# Patient Record
Sex: MALE | Race: WHITE | NOT HISPANIC OR LATINO | Employment: UNEMPLOYED | URBAN - NONMETROPOLITAN AREA
[De-identification: names, ages, dates, MRNs, and addresses within clinical notes are randomized per-mention and may not be internally consistent; named-entity substitution may affect disease eponyms.]

---

## 2018-07-05 NOTE — PROGRESS NOTES
"  Subjective     PROBLEM LIST:  1. Leukocytosis  2. Depression/anxiety  3. Thyroid disease  4. Hypertension  5. Ankylosing spondylitis  6. Alopecia     CHIEF COMPLAINT: leukocytosis      HISTORY OF PRESENT ILLNESS:  The patient is a 47 y.o. year old male, referred for evaluation of leukocytosis.  He reports that his white count was elevated since January that has been gradually improving.  When he had his last blood work done in April he was recovering from an episode of bronchitis.  He says he has had some shoulder pain and general muscle stiffness.  He has a history of ankylosing spondylitis but is not currently on any treatment for that.    He smokes 1.5 ppd x 27 years.    REVIEW OF SYSTEMS:  A 14 point review of systems was performed and is negative except as noted above.    History reviewed. No pertinent past medical history.      No current outpatient prescriptions on file prior to visit.     No current facility-administered medications on file prior to visit.        No Known Allergies    History reviewed. No pertinent surgical history.    Social History     Social History   • Marital status: Unknown     Social History Main Topics   • Smoking status: Heavy Tobacco Smoker     Packs/day: 1.50   • Alcohol use Yes   • Drug use: Yes     Other Topics Concern   • Not on file       History reviewed. No pertinent family history.    Objective     /95   Pulse 65   Temp 98.2 °F (36.8 °C) (Temporal Artery )   Resp 17   Ht 182.9 cm (72\")   Wt 114 kg (252 lb)   BMI 34.18 kg/m²   Performance Status: 0  General: well appearing male in no acute distress  Neuro: alert and oriented  HEENT: sclera anicteric, oropharynx clear  Lymphatics: no cervical, supraclavicular, or axillary adenopathy  Cardiovascular: regular rate and rhythm, no murmurs  Lungs: clear to auscultation bilaterally  Abdomen: soft, nontender, nondistended.  No palpable organomegaly  Extremeties: no lower extremity edema  Skin: no rashes, lesions, " bruising, or petechiae  Psych: mood and affect appropriate      Labs:  4/12/18 - wbc 11.9, hgb 16.5, plt 217, ANC 7557  2/14/18 - wbc 13.3, hgb 152, plt 178, ANC 9363  1/26/18 - wbc 17.8, hgb 162, plt 210      Assessment/Plan     J Carlos Reece is a 47 y.o. year old male referred for evaluation of leukocytosis.  We discussed the potential causes of an elevated white count including inflammation, infection, or bone marrow disease.  Cigarette use can cause a chronic inflammation and is commonly associated with a chronic elevation of the white count.  Chronic inflammatory diseases such as rheumatoid arthritis or other autoimmune diseases can cause a leukocytosis.  Bone marrow diseases are less common but include myeloproliferative disorders like chronic myelogenous leukemia or polycythemia vera.    In his situation his white count elevation is fairly mild and has actually been decreasing over the past few months.  He does have a history of autoimmune disease, which could be contributing to the white count elevation.  It is also possible that his cigarette use is contributing.  We will recheck his labs today and I will contact him with the results.  If his white count is normal at all think any further workup is needed.  If it remains mildly elevated and I will recommend follow-up in about 3 months.             Inga Gerardo MD    7/6/2018

## 2018-07-06 ENCOUNTER — CONSULT (OUTPATIENT)
Dept: ONCOLOGY | Facility: CLINIC | Age: 48
End: 2018-07-06

## 2018-07-06 ENCOUNTER — APPOINTMENT (OUTPATIENT)
Dept: LAB | Facility: HOSPITAL | Age: 48
End: 2018-07-06

## 2018-07-06 VITALS
RESPIRATION RATE: 17 BRPM | DIASTOLIC BLOOD PRESSURE: 95 MMHG | HEIGHT: 72 IN | HEART RATE: 65 BPM | BODY MASS INDEX: 34.13 KG/M2 | WEIGHT: 252 LBS | TEMPERATURE: 98.2 F | SYSTOLIC BLOOD PRESSURE: 149 MMHG

## 2018-07-06 DIAGNOSIS — D72.829 LEUKOCYTOSIS, UNSPECIFIED TYPE: Primary | ICD-10-CM

## 2018-07-06 LAB
BASOPHILS # BLD AUTO: 0.25 10*3/MM3 (ref 0–0.2)
BASOPHILS NFR BLD AUTO: 1.5 % (ref 0–2.5)
DEPRECATED RDW RBC AUTO: 48.2 FL (ref 37–54)
EOSINOPHIL # BLD AUTO: 0.44 10*3/MM3 (ref 0–0.7)
EOSINOPHIL NFR BLD AUTO: 2.7 % (ref 0–7)
ERYTHROCYTE [DISTWIDTH] IN BLOOD BY AUTOMATED COUNT: 14.2 % (ref 11.5–14.5)
HCT VFR BLD AUTO: 49.1 % (ref 42–52)
HGB BLD-MCNC: 16.7 G/DL (ref 14–18)
IMM GRANULOCYTES # BLD: 0.07 10*3/MM3 (ref 0–0.06)
IMM GRANULOCYTES NFR BLD: 0.4 % (ref 0–0.6)
LYMPHOCYTES # BLD AUTO: 3.18 10*3/MM3 (ref 0.6–3.4)
LYMPHOCYTES NFR BLD AUTO: 19.6 % (ref 10–50)
MCH RBC QN AUTO: 31.5 PG (ref 27–31)
MCHC RBC AUTO-ENTMCNC: 34 G/DL (ref 30–37)
MCV RBC AUTO: 92.6 FL (ref 80–94)
MONOCYTES # BLD AUTO: 1.06 10*3/MM3 (ref 0–0.9)
MONOCYTES NFR BLD AUTO: 6.5 % (ref 0–12)
NEUTROPHILS # BLD AUTO: 11.19 10*3/MM3 (ref 2–6.9)
NEUTROPHILS NFR BLD AUTO: 69.3 % (ref 37–80)
NRBC BLD MANUAL-RTO: 0 /100 WBC (ref 0–0)
PLATELET # BLD AUTO: 211 10*3/MM3 (ref 130–400)
PMV BLD AUTO: 12.2 FL (ref 6–12)
RBC # BLD AUTO: 5.3 10*6/MM3 (ref 4.7–6.1)
WBC NRBC COR # BLD: 16.19 10*3/MM3 (ref 4.8–10.8)

## 2018-07-06 PROCEDURE — 85025 COMPLETE CBC W/AUTO DIFF WBC: CPT | Performed by: INTERNAL MEDICINE

## 2018-07-06 PROCEDURE — 36415 COLL VENOUS BLD VENIPUNCTURE: CPT | Performed by: INTERNAL MEDICINE

## 2018-07-06 PROCEDURE — 99204 OFFICE O/P NEW MOD 45 MIN: CPT | Performed by: INTERNAL MEDICINE

## 2018-07-06 RX ORDER — LEVOTHYROXINE SODIUM 0.07 MG/1
TABLET ORAL
Refills: 0 | COMMUNITY
Start: 2018-05-09

## 2018-07-06 RX ORDER — LISINOPRIL AND HYDROCHLOROTHIAZIDE 12.5; 1 MG/1; MG/1
TABLET ORAL
Refills: 0 | COMMUNITY
Start: 2018-05-24

## 2018-07-06 RX ORDER — LORATADINE 10 MG/1
10 TABLET ORAL DAILY
Refills: 0 | COMMUNITY
Start: 2018-05-17

## 2018-07-06 RX ORDER — FLUTICASONE PROPIONATE 50 MCG
2 SPRAY, SUSPENSION (ML) NASAL DAILY
COMMUNITY

## 2018-07-06 RX ORDER — ATENOLOL 50 MG/1
TABLET ORAL
Refills: 0 | COMMUNITY
Start: 2018-04-26

## 2018-07-06 RX ORDER — LANSOPRAZOLE 30 MG/1
CAPSULE, DELAYED RELEASE ORAL
Refills: 0 | COMMUNITY
Start: 2018-05-09

## 2018-07-10 ENCOUNTER — TELEPHONE (OUTPATIENT)
Dept: ONCOLOGY | Facility: CLINIC | Age: 48
End: 2018-07-10

## 2018-07-10 NOTE — TELEPHONE ENCOUNTER
Called patient to discuss lab results.  WBC is 16K, within the range of what it's been for the past 6 months.  This is most likely related to inflammation or cigarette use.  Recommended following up with rheumatology re: his joint symptoms.  Otherwise I will see him back in 3 months to recheck CBC.

## 2018-10-12 ENCOUNTER — OFFICE VISIT (OUTPATIENT)
Dept: ONCOLOGY | Facility: CLINIC | Age: 48
End: 2018-10-12

## 2018-10-12 ENCOUNTER — APPOINTMENT (OUTPATIENT)
Dept: LAB | Facility: HOSPITAL | Age: 48
End: 2018-10-12

## 2018-10-12 VITALS
SYSTOLIC BLOOD PRESSURE: 131 MMHG | RESPIRATION RATE: 18 BRPM | HEART RATE: 58 BPM | BODY MASS INDEX: 34 KG/M2 | TEMPERATURE: 98 F | WEIGHT: 251 LBS | HEIGHT: 72 IN | DIASTOLIC BLOOD PRESSURE: 81 MMHG

## 2018-10-12 DIAGNOSIS — D72.829 LEUKOCYTOSIS, UNSPECIFIED TYPE: Primary | ICD-10-CM

## 2018-10-12 LAB
BASOPHILS # BLD AUTO: 0.17 10*3/MM3 (ref 0–0.2)
BASOPHILS NFR BLD AUTO: 1.3 % (ref 0–2.5)
DEPRECATED RDW RBC AUTO: 47.8 FL (ref 37–54)
EOSINOPHIL # BLD AUTO: 0.35 10*3/MM3 (ref 0–0.7)
EOSINOPHIL NFR BLD AUTO: 2.7 % (ref 0–7)
ERYTHROCYTE [DISTWIDTH] IN BLOOD BY AUTOMATED COUNT: 13.4 % (ref 11.5–14.5)
HCT VFR BLD AUTO: 45.7 % (ref 42–52)
HGB BLD-MCNC: 15.3 G/DL (ref 14–18)
IMM GRANULOCYTES # BLD: 0.05 10*3/MM3 (ref 0–0.06)
IMM GRANULOCYTES NFR BLD: 0.4 % (ref 0–0.6)
LYMPHOCYTES # BLD AUTO: 2.74 10*3/MM3 (ref 0.6–3.4)
LYMPHOCYTES NFR BLD AUTO: 20.8 % (ref 10–50)
MCH RBC QN AUTO: 31.6 PG (ref 27–31)
MCHC RBC AUTO-ENTMCNC: 33.5 G/DL (ref 30–37)
MCV RBC AUTO: 94.4 FL (ref 80–94)
MONOCYTES # BLD AUTO: 0.9 10*3/MM3 (ref 0–0.9)
MONOCYTES NFR BLD AUTO: 6.8 % (ref 0–12)
NEUTROPHILS # BLD AUTO: 8.97 10*3/MM3 (ref 2–6.9)
NEUTROPHILS NFR BLD AUTO: 68 % (ref 37–80)
NRBC BLD MANUAL-RTO: 0 /100 WBC (ref 0–0)
PLATELET # BLD AUTO: 205 10*3/MM3 (ref 130–400)
PMV BLD AUTO: 11.5 FL (ref 6–12)
RBC # BLD AUTO: 4.84 10*6/MM3 (ref 4.7–6.1)
WBC NRBC COR # BLD: 13.18 10*3/MM3 (ref 4.8–10.8)

## 2018-10-12 PROCEDURE — 85025 COMPLETE CBC W/AUTO DIFF WBC: CPT | Performed by: NURSE PRACTITIONER

## 2018-10-12 PROCEDURE — 36415 COLL VENOUS BLD VENIPUNCTURE: CPT | Performed by: NURSE PRACTITIONER

## 2018-10-12 PROCEDURE — 99213 OFFICE O/P EST LOW 20 MIN: CPT | Performed by: NURSE PRACTITIONER

## 2018-10-12 RX ORDER — BUSPIRONE HYDROCHLORIDE 30 MG/1
30 TABLET ORAL 2 TIMES DAILY
Refills: 0 | COMMUNITY
Start: 2018-10-03

## 2018-10-12 RX ORDER — NAPROXEN 500 MG/1
500 TABLET ORAL 2 TIMES DAILY
Refills: 0 | COMMUNITY
Start: 2018-08-21

## 2018-10-12 RX ORDER — CHLORAL HYDRATE 500 MG
CAPSULE ORAL
COMMUNITY

## 2018-10-12 NOTE — PROGRESS NOTES
"  Subjective     PROBLEM LIST:  1. Leukocytosis  2. Depression/anxiety  3. Thyroid disease  4. Hypertension  5. Ankylosing spondylitis  6. Alopecia     CHIEF COMPLAINT: Leukocytosis      HISTORY OF PRESENT ILLNESS:  The patient is a 48 y.o. year old male, here for follow up for leukocytosis.  He has been doing fairly well.  He has had some recent back pain that he describes as chronic.  He continues to smoke.  He has not seen a rheumatologist yet.  He plans to make an appointment soon. Denies night sweats, fevers, or chronic illnesses.         REVIEW OF SYSTEMS:  A 14 point review of systems was performed and is negative except as noted above.    No past medical history on file.      Current Outpatient Prescriptions on File Prior to Visit   Medication Sig Dispense Refill   • atenolol (TENORMIN) 50 MG tablet   0   • Cholecalciferol (VITAMIN D3) 5000 units capsule capsule Take 5,000 Units by mouth Daily.     • fluticasone (FLONASE) 50 MCG/ACT nasal spray 2 sprays into each nostril Daily.     • lansoprazole (PREVACID) 30 MG capsule   0   • levothyroxine (SYNTHROID, LEVOTHROID) 75 MCG tablet   0   • lisinopril-hydrochlorothiazide (PRINZIDE,ZESTORETIC) 10-12.5 MG per tablet   0   • loratadine (CLARITIN) 10 MG tablet Take 10 mg by mouth Daily.  0   • [DISCONTINUED] sertraline (ZOLOFT) 50 MG tablet   0     No current facility-administered medications on file prior to visit.        No Known Allergies    No past surgical history on file.    Social History     Social History   • Marital status: Unknown     Social History Main Topics   • Smoking status: Heavy Tobacco Smoker     Packs/day: 1.50   • Alcohol use Yes   • Drug use: Yes     Other Topics Concern   • Not on file       No family history on file.    Objective     /81   Pulse 58   Temp 98 °F (36.7 °C) (Temporal Artery )   Resp 18   Ht 182.9 cm (72\")   Wt 114 kg (251 lb)   BMI 34.04 kg/m²   Performance Status: 0  General: well appearing male in no acute " distress  Neuro: alert and oriented  HEENT: sclera anicteric, oropharynx clear  Lymphatics: no cervical, supraclavicular, or axillary adenopathy  Cardiovascular: regular rate and rhythm, no murmurs  Lungs: clear to auscultation bilaterally  Abdomen: soft, nontender, nondistended.  No palpable organomegaly  Extremeties: no lower extremity edema  Skin: no rashes, lesions, bruising, or petechiae  Psych: mood and affect appropriate      Labs: No recent labs: 07/06/2018 Wbc: 16.19, Hgb: 16.7, Hct: 49.1, Plt: 211      Assessment/Plan     J Carlos Reece is a 48 y.o. year old male here for follow up for  leukocytosis. His WBC remained mild and relatively stable.  We will continue to monitor every 6 months.  I will order labs today and call him with any abnormal results.  We reviewed the potential causes of an elevated white count including inflammation, infection, or bone marrow disease.  Cigarette use can cause a chronic inflammation and is commonly associated with a chronic elevation of the white count.  We spoke for 5 minutes on smoking cessation and does not want to quit at this moment.  He has a history of autoimmune diseases that could be contributing to his  leukocytosis.  He plans to schedule don appointment with a Rheumatologist soon.    Visit time was 15 minutes, greater than 50% spent in counseling       Edda Kelley, APRN    10/12/2018